# Patient Record
Sex: FEMALE | Race: AMERICAN INDIAN OR ALASKA NATIVE
[De-identification: names, ages, dates, MRNs, and addresses within clinical notes are randomized per-mention and may not be internally consistent; named-entity substitution may affect disease eponyms.]

---

## 2019-06-23 ENCOUNTER — HOSPITAL ENCOUNTER (EMERGENCY)
Dept: HOSPITAL 5 - ED | Age: 24
Discharge: HOME | End: 2019-06-23
Payer: MEDICAID

## 2019-06-23 VITALS — DIASTOLIC BLOOD PRESSURE: 85 MMHG | SYSTOLIC BLOOD PRESSURE: 121 MMHG

## 2019-06-23 DIAGNOSIS — I16.0: Primary | ICD-10-CM

## 2019-06-23 DIAGNOSIS — Z79.899: ICD-10-CM

## 2019-06-23 LAB
BILIRUB UR QL STRIP: (no result)
BLOOD UR QL VISUAL: (no result)
MUCOUS THREADS #/AREA URNS HPF: (no result) /HPF
PH UR STRIP: 6 [PH] (ref 5–7)
PROT UR STRIP-MCNC: (no result) MG/DL
RBC #/AREA URNS HPF: 4 /HPF (ref 0–6)
UROBILINOGEN UR-MCNC: < 2 MG/DL (ref ?–2)
WBC #/AREA URNS HPF: 1 /HPF (ref 0–6)

## 2019-06-23 PROCEDURE — 99283 EMERGENCY DEPT VISIT LOW MDM: CPT

## 2019-06-23 PROCEDURE — 93005 ELECTROCARDIOGRAM TRACING: CPT

## 2019-06-23 PROCEDURE — 93010 ELECTROCARDIOGRAM REPORT: CPT

## 2019-06-23 PROCEDURE — 81001 URINALYSIS AUTO W/SCOPE: CPT

## 2019-06-23 NOTE — EMERGENCY DEPARTMENT REPORT
ED General Adult HPI





- General


Chief complaint: High BP


Stated complaint: HIGH BP


Time Seen by Provider: 06/23/19 08:42


Source: patient


Mode of arrival: Ambulatory


Limitations: No Limitations





- History of Present Illness


Initial comments: 





Patient is a 23-year-old  female who is presenting with concerns

of elevated blood pressure.  Patient states she is approximately 2 weeks status 

post stillborn birth.  Patient was at 20 weeks gestation.  At the time that she 

delivered a patient's blood pressure was 140/98.  The decision was made to just 

continue to monitor the patient's blood pressure.  The patient was given a 

cardiologist for follow-up but did not follow-up as ordered.  Initially patient 

blood pressure was 141/95 the patient continued to be concerned and came to the 

emergency department today.  Patient states that since her stillborn birth 

patient is a chest pressure this been constant.  She has had episodes of 

shortness of breath as well and palpitations.  Patient denies any nausea 

vomiting diarrhea fevers or chills at this time.





- Related Data


                                  Previous Rx's











 Medication  Instructions  Recorded  Last Taken  Type


 


ALPRAZolam [Xanax TAB] 0.25 mg PO BID PRN #10 tab 06/23/19 Unknown Rx











                                    Allergies











Allergy/AdvReac Type Severity Reaction Status Date / Time


 


No Known Allergies Allergy   Unverified 06/23/19 08:36














ED Review of Systems


ROS: 


Stated complaint: HIGH BP


Other details as noted in HPI





Comment: All other systems reviewed and negative





ED Past Medical Hx





- Past Medical History


Previous Medical History?: Yes


Additional medical history: Miscarriage 5-





- Surgical History


Past Surgical History?: No





- Social History


Smoking Status: Never Smoker


Substance Use Type: None





- Medications


Home Medications: 


                                Home Medications











 Medication  Instructions  Recorded  Confirmed  Last Taken  Type


 


ALPRAZolam [Xanax TAB] 0.25 mg PO BID PRN #10 tab 06/23/19  Unknown Rx














ED Physical Exam





- General


Limitations: No Limitations


General appearance: alert, in no apparent distress





- Head


Head exam: Present: atraumatic, normocephalic





- Eye


Eye exam: Present: normal appearance





- ENT


ENT exam: Present: mucous membranes moist





- Neck


Neck exam: Present: normal inspection





- Respiratory


Respiratory exam: Present: normal lung sounds bilaterally.  Absent: respiratory 

distress, wheezes, rales, rhonchi





- Cardiovascular


Cardiovascular Exam: Present: regular rate, normal rhythm.  Absent: systolic 

murmur, diastolic murmur, rubs, gallop





- GI/Abdominal


GI/Abdominal exam: Present: soft, normal bowel sounds.  Absent: distended, 

tenderness, guarding, rebound





- Extremities Exam


Extremities exam: Present: normal inspection





- Back Exam


Back exam: Present: normal inspection





- Neurological Exam


Neurological exam: Present: alert, oriented X3





- Psychiatric


Psychiatric exam: Present: normal affect, normal mood





- Skin


Skin exam: Present: warm, dry, intact, normal color.  Absent: rash





ED Course


                                   Vital Signs











  06/23/19 06/23/19





  08:32 09:39


 


Temperature 98.3 F 98.3 F


 


Pulse Rate 75 64


 


Respiratory 20 18





Rate  


 


Blood Pressure 132/90 


 


Blood Pressure  121/85





[Left]  


 


O2 Sat by Pulse 99 97





Oximetry  














ED Medical Decision Making





- Lab Data








                                   Lab Results











  06/23/19 Range/Units





  09:03 


 


Urine Color  Yellow  (Yellow)  


 


Urine Turbidity  Slightly-cloudy  (Clear)  


 


Urine pH  6.0  (5.0-7.0)  


 


Ur Specific Gravity  1.014  (1.003-1.030)  


 


Urine Protein  <15 mg/dl  (Negative)  mg/dL


 


Urine Glucose (UA)  Neg  (Negative)  mg/dL


 


Urine Ketones  Neg  (Negative)  mg/dL


 


Urine Blood  Mod  (Negative)  


 


Urine Nitrite  Neg  (Negative)  


 


Urine Bilirubin  Neg  (Negative)  


 


Urine Urobilinogen  < 2.0  (<2.0)  mg/dL


 


Ur Leukocyte Esterase  Neg  (Negative)  


 


Urine WBC (Auto)  1.0  (0.0-6.0)  /HPF


 


Urine RBC (Auto)  4.0  (0.0-6.0)  /HPF


 


U Epithel Cells (Auto)  9.0  (0-13.0)  /HPF


 


Urine Mucus  Few  /HPF














- EKG Data


-: EKG Interpreted by Me





- EKG Data





06/23/19 08:54


EKG shows a sinus rhythm at a rate of 69.  Axis is normal intervals are normal. 

 There are no ST segment elevations or depressions.  Time of interpretation is a

 55





- Medical Decision Making





After talking to the patient and kind of relieving some of her fears and 

concerns patient's blood pressure actually normalized.  Urinalysis was negative 

for ketone urea or proteinuria.  The patient does not appear to have 

preeclampsia at this time.  Patient will be continued to monitor blood pressure.

  The patient did have a stillborn birth at 20 weeks has been very upset.  

Patient's chest discomfort and slight elevation of her blood pressure likely 

secondary to this trauma.  Patient started on Xanax and we will continue to 

watch her blood pressure without starting the patient on antihypertensives.


Critical care attestation.: 


If time is entered above; I have spent that time in minutes in the direct care 

of this critically ill patient, excluding procedure time.








ED Disposition


Clinical Impression: 


 Hypertensive urgency, Acute chest pain, Grief at loss of child





Disposition: DC-01 TO HOME OR SELFCARE


Is pt being admited?: No


Does the pt Need Aspirin: No


Condition: Stable


Instructions:  Chest Pain (ED), Hypertension (ED), Stress (ED), Post Traumatic 

Stress Disorder (ED)


Referrals: 


[facility], [place] [Referring] - 3-5 Days


Time of Disposition: 09:53